# Patient Record
Sex: FEMALE | HISPANIC OR LATINO | URBAN - METROPOLITAN AREA
[De-identification: names, ages, dates, MRNs, and addresses within clinical notes are randomized per-mention and may not be internally consistent; named-entity substitution may affect disease eponyms.]

---

## 2024-06-06 ENCOUNTER — OFFICE VISIT (OUTPATIENT)
Dept: URBAN - METROPOLITAN AREA CLINIC 113 | Facility: CLINIC | Age: 39
End: 2024-06-06
Payer: SELF-PAY

## 2024-06-06 VITALS
WEIGHT: 144.4 LBS | HEIGHT: 62 IN | RESPIRATION RATE: 20 BRPM | HEART RATE: 87 BPM | SYSTOLIC BLOOD PRESSURE: 115 MMHG | BODY MASS INDEX: 26.57 KG/M2 | TEMPERATURE: 99 F | DIASTOLIC BLOOD PRESSURE: 57 MMHG

## 2024-06-06 DIAGNOSIS — K75.4 AUTOIMMUNE HEPATITIS: ICD-10-CM

## 2024-06-06 DIAGNOSIS — J84.9 INTERSTITIAL LUNG DISEASE: ICD-10-CM

## 2024-06-06 DIAGNOSIS — D69.6 THROMBOCYTOPENIA: ICD-10-CM

## 2024-06-06 DIAGNOSIS — K74.60 CIRRHOSIS: ICD-10-CM

## 2024-06-06 PROCEDURE — 99212 OFFICE O/P EST SF 10 MIN: CPT | Performed by: INTERNAL MEDICINE

## 2024-06-06 RX ORDER — AZATHIOPRINE 50 MG/1
ONE HALF TABLET TABLET ORAL DAILY
Qty: 15 | Refills: 11 | OUTPATIENT
Start: 2024-06-06 | End: 2025-06-01

## 2024-06-06 RX ORDER — PREDNISONE 10 MG/1
2 TABLETS TABLET ORAL ONCE A DAY
Qty: 60 TABLET | Refills: 5 | Status: ACTIVE | COMMUNITY
Start: 2024-05-09 | End: 2024-11-04

## 2024-06-06 RX ORDER — UBIDECARENONE 30 MG
AS DIRECTED CAPSULE ORAL
Status: ACTIVE | COMMUNITY

## 2024-06-06 NOTE — HPI-TODAY'S VISIT:
Ms. Valentine is a 38 year old [place]i female with a history of autoimmune hepatitis previously on azathioprine and prednisone daily with compensated cirrhosis diagnosed 12/2013, chronic respiratory failure presenting for follow up.   She presents today for evaluation of elevated liver test. She required the use of an . We had a video monitor set up for the  to assist with our visit. She has elevation of her liver test. She did not mention any specific problems of shortness of breath, dysphagia, heartburn or regurgitation. No abdominal pain. She has some pruritus. She has a nonspecific petechial rash on the upper and lower extremities.  Labs.  April 2024.  Hemoglobin 13.9.  Platelet count 45,000.  Creatinine 0.6.  Iron level elevated at 174.  Ferritin 375.  Total bilirubin 4.2.  AST 79.  ALT 57.  Alkaline phos is 154.  Triglycerides 83.  Labs.  December 2023.  Hemoglobin 13.2.  Platelet count 53,000.  Creatinine 0.6.  AST 79.  Bilirubin 2.3.  ALT 57.  Alk phos is 150.   Labs August 2022:Total bilirubin 1.3. AST 47, ALT 36. Alkaline phosphatase 121. INR 1.3. Hemoglobin 12.1. Platelet count 56,000. Alpha-fetoprotein 4.4.  Labs July 2021: AST 45, ALT 26, alk phos 163, T bili 1.8, albumin 3.3, BUN 13, creatinine 0.7; WBC 3.8, hemoglobin 13.8, MCV 92, platelets 48  Labs Jan 2021: , , , Tbili 1.9. H/H 14.0/41.6, MCV 89.5, Plt 54, WBC 2.68.   Labs 2020:  Hemoglobin 12.1, WBC 3.7, platelets 52, hepatitis C antibody negative, hepatitis B surface antigen negative   Abdominal ultrasound. June 2020. Diffusely abnormal hepatic echogenicity consistent with cirrhosis. Cholelithiasis. Splenomegaly.  Abdominal ultrasound 2019: revealed no ascites, cirrhosis, splenomegaly, cholelithiasis without bile duct dilation of 5 mm  CT chest.  September 2019.  Embolization coils are noted in the left lower lobe.  There is some small bilateral basal AVMs which appear similar to previous evaluation.  Some varices were notable in the upper abdomen.  Cholelithiasis noted.  Abdominal ultrasound 2018.  Hepatic cirrhosis, cholelithiasis, and splenomegaly noted.  Abdominal ultrasound 2017.  Cirrhosis noted.  No masses identified.  Abd Ultrasound 2016: increased echogenicity with lobulated surface, suggesting cirrhosis. No mass.  CT.  Chest.  With contrast.  October 2014.  Multiple peripheral nodular opacities in the lower lobes suggestive of pulmonary AVMs.  Left-sided vascular coils were noted.  Cirrhosis.  Splenomegaly.  Periportal and perigastric varices noted.  Interventional radiology.  Dr. Rendon.  October 2014.  Pulmonary angiogram with AVM embolization performed.  Abd ultrasound 2014: cirrhotic hepatic morphology.  Abdominal doppler US 2013: patent portal vein with flow into the liver. Liver suggests cirrhosis, no ascites.  CTA/CT chest PE 2013: moderate bilateral reticulonodular interstitial disease of uncertain etiology. Cirrhosis with splenomegaly.  Labs.  December 2013.  Hepatitis panel negative.  MADAY elevated at 1:1280.  Sjogren's antibodies were positive.  Complements were strikingly low.  HIV negative.  Rheumatoid factor strikingly elevated.  Serum IgG level [id number].  Anti-smooth muscle antibody negative.  Anti-LKM negative. Anti-mitochondrial antibody negative. She is doing very well at this time.  She still has a petechial rash.  She states that she feels better.  I think she may be less jaundiced.  She is not complaining of any abdominal pain.  No nausea vomiting.  No rectal bleeding or melena.  No insurance.  They had their labs done at [facility].  May be at [place].  I tried to pull up the labs at the [facility] and I cannot find her in their system.

## 2024-06-09 ENCOUNTER — TELEPHONE ENCOUNTER (OUTPATIENT)
Dept: URBAN - METROPOLITAN AREA CLINIC 113 | Facility: CLINIC | Age: 39
End: 2024-06-09

## 2024-07-05 ENCOUNTER — TELEPHONE ENCOUNTER (OUTPATIENT)
Dept: URBAN - METROPOLITAN AREA CLINIC 113 | Facility: CLINIC | Age: 39
End: 2024-07-05

## 2024-07-18 ENCOUNTER — OFFICE VISIT (OUTPATIENT)
Dept: URBAN - METROPOLITAN AREA CLINIC 113 | Facility: CLINIC | Age: 39
End: 2024-07-18
Payer: SELF-PAY

## 2024-07-18 VITALS
BODY MASS INDEX: 26.5 KG/M2 | HEIGHT: 62 IN | DIASTOLIC BLOOD PRESSURE: 57 MMHG | HEART RATE: 81 BPM | RESPIRATION RATE: 20 BRPM | SYSTOLIC BLOOD PRESSURE: 103 MMHG | WEIGHT: 144 LBS | TEMPERATURE: 98.4 F

## 2024-07-18 DIAGNOSIS — R16.1 SPLENOMEGALY: ICD-10-CM

## 2024-07-18 DIAGNOSIS — K75.4 AUTOIMMUNE HEPATITIS: ICD-10-CM

## 2024-07-18 DIAGNOSIS — K74.69 CIRRHOSIS, CRYPTOGENIC: ICD-10-CM

## 2024-07-18 DIAGNOSIS — D69.6 THROMBOCYTOPENIA: ICD-10-CM

## 2024-07-18 PROCEDURE — 99212 OFFICE O/P EST SF 10 MIN: CPT | Performed by: INTERNAL MEDICINE

## 2024-07-18 RX ORDER — AZATHIOPRINE 50 MG/1
ONE HALF TABLET TABLET ORAL DAILY
Qty: 15 | Refills: 11 | Status: ACTIVE | COMMUNITY
Start: 2024-06-06 | End: 2025-06-01

## 2024-07-18 RX ORDER — UBIDECARENONE 30 MG
AS DIRECTED CAPSULE ORAL
Status: ACTIVE | COMMUNITY

## 2024-07-18 RX ORDER — AZATHIOPRINE 75 MG/1
1 TABLET TABLET ORAL ONCE DAILY
Qty: 30 | Refills: 11 | OUTPATIENT
Start: 2024-07-18 | End: 2025-07-13

## 2024-07-18 RX ORDER — PREDNISONE 10 MG/1
2 TABLETS TABLET ORAL ONCE A DAY
Qty: 60 TABLET | Refills: 5 | Status: ACTIVE | COMMUNITY
Start: 2024-05-09 | End: 2024-11-04

## 2024-07-18 RX ORDER — PREDNISONE 5 MG/1
3 TABLETS TABLET ORAL ONCE A DAY
Qty: 90 TABLET | Refills: 11 | OUTPATIENT
Start: 2024-07-18 | End: 2025-07-13

## 2024-07-18 NOTE — HPI-TODAY'S VISIT:
39 yo F presents with a history of autoimmune hepatitis  She is currently without symptoms. The petechial rash on the upper extremities seem to be better. The lower extremity rash remains. This is related to her thrombocytopenia. She is not having dysphagia heartburn or regurgitation. No abdominal pain. No diarrhea constipation. No rectal bleeding or melena  Labs.  June 28, 2024.  AST 55, ALT 35.  Alkaline phos is 146.  Total bilirubin 2.6.  Creatinine 0.7.  Hemoglobin 13.8.  Platelet count 46,000.  Labs.  June 5, 2024.  Hepatitis C genotype indeterminate.  The wrong test was ordered.  AST 51, ALT 45.  Alk phos is 166.  Total bilirubin 3.0.  Platelet count 37,000. Ms. Valentine is a 38 year old [place] female with a history of autoimmune hepatitis previously on azathioprine and prednisone daily with compensated cirrhosis diagnosed 12/2013, chronic respiratory failure presenting for follow up.  Labs.  April 2024.  Hemoglobin 13.9.  Platelet count 45,000.  Creatinine 0.6.  Iron level elevated at 174.  Ferritin 375.  Total bilirubin 4.2.  AST 79.  ALT 57.  Alkaline phos is 154.  Triglycerides 83.  Labs.  December 2023.  Hemoglobin 13.2.  Platelet count 53,000.  Creatinine 0.6.  AST 79.  Bilirubin 2.3.  ALT 57.  Alk phos is 150.   Labs August 2022:Total bilirubin 1.3. AST 47, ALT 36. Alkaline phosphatase 121. INR 1.3. Hemoglobin 12.1. Platelet count 56,000. Alpha-fetoprotein 4.4.  Labs July 2021: AST 45, ALT 26, alk phos 163, T bili 1.8, albumin 3.3, BUN 13, creatinine 0.7; WBC 3.8, hemoglobin 13.8, MCV 92, platelets 48  Labs Jan 2021: , , , Tbili 1.9. H/H 14.0/41.6, MCV 89.5, Plt 54, WBC 2.68.   Labs 2020:  Hemoglobin 12.1, WBC 3.7, platelets 52, hepatitis C antibody negative, hepatitis B surface antigen negative   Abdominal ultrasound. June 2020. Diffusely abnormal hepatic echogenicity consistent with cirrhosis. Cholelithiasis. Splenomegaly.  Abdominal ultrasound 2019: revealed no ascites, cirrhosis, splenomegaly, cholelithiasis without bile duct dilation of 5 mm  CT chest.  September 2019.  Embolization coils are noted in the left lower lobe.  There is some small bilateral basal AVMs which appear similar to previous evaluation.  Some varices were notable in the upper abdomen.  Cholelithiasis noted.  Abdominal ultrasound 2018.  Hepatic cirrhosis, cholelithiasis, and splenomegaly noted.  Abdominal ultrasound 2017.  Cirrhosis noted.  No masses identified.  Abd Ultrasound 2016: increased echogenicity with lobulated surface, suggesting cirrhosis. No mass.  CT.  Chest.  With contrast.  October 2014.  Multiple peripheral nodular opacities in the lower lobes suggestive of pulmonary AVMs.  Left-sided vascular coils were noted.  Cirrhosis.  Splenomegaly.  Periportal and perigastric varices noted.  Interventional radiology.  Dr. Rendon.  October 2014.  Pulmonary angiogram with AVM embolization performed.  Abd ultrasound 2014: cirrhotic hepatic morphology.  Abdominal doppler US 2013: patent portal vein with flow into the liver. Liver suggests cirrhosis, no ascites.  CTA/CT chest PE 2013: moderate bilateral reticulonodular interstitial disease of uncertain etiology. Cirrhosis with splenomegaly.  Labs.  December 2013.  Hepatitis panel negative.  MADAY elevated at 1:1280.  Sjogren's antibodies were positive.  Complements were strikingly low.  HIV negative.  Rheumatoid factor strikingly elevated.  Serum IgG level 4964.  Anti-smooth muscle antibody negative.  Anti-LKM negative. Anti-mitochondrial antibody negative.

## 2024-07-22 ENCOUNTER — TELEPHONE ENCOUNTER (OUTPATIENT)
Dept: URBAN - METROPOLITAN AREA CLINIC 113 | Facility: CLINIC | Age: 39
End: 2024-07-22

## 2024-07-22 RX ORDER — AZATHIOPRINE 50 MG/1
ONE TABLET TABLET ORAL DAILY
Qty: 30 | Refills: 11
Start: 2024-06-06 | End: 2025-07-17

## 2024-09-19 ENCOUNTER — OFFICE VISIT (OUTPATIENT)
Dept: URBAN - METROPOLITAN AREA CLINIC 113 | Facility: CLINIC | Age: 39
End: 2024-09-19

## 2024-09-30 ENCOUNTER — OFFICE VISIT (OUTPATIENT)
Dept: URBAN - METROPOLITAN AREA CLINIC 113 | Facility: CLINIC | Age: 39
End: 2024-09-30

## 2024-11-21 ENCOUNTER — OFFICE VISIT (OUTPATIENT)
Dept: URBAN - METROPOLITAN AREA CLINIC 113 | Facility: CLINIC | Age: 39
End: 2024-11-21

## 2025-07-14 ENCOUNTER — TELEPHONE ENCOUNTER (OUTPATIENT)
Dept: URBAN - METROPOLITAN AREA CLINIC 113 | Facility: CLINIC | Age: 40
End: 2025-07-14

## 2025-07-14 RX ORDER — PREDNISONE 10 MG/1
1 TABLET WITH FOOD OR MILK TABLET ORAL ONCE A DAY
Qty: 30 | Refills: 11 | OUTPATIENT
Start: 2025-07-15

## 2025-08-13 ENCOUNTER — OFFICE VISIT (OUTPATIENT)
Dept: URBAN - METROPOLITAN AREA CLINIC 107 | Facility: CLINIC | Age: 40
End: 2025-08-13